# Patient Record
Sex: FEMALE | Race: WHITE | NOT HISPANIC OR LATINO | Employment: OTHER | URBAN - NONMETROPOLITAN AREA
[De-identification: names, ages, dates, MRNs, and addresses within clinical notes are randomized per-mention and may not be internally consistent; named-entity substitution may affect disease eponyms.]

---

## 2020-01-28 ENCOUNTER — OUTSIDE FACILITY SERVICE (OUTPATIENT)
Dept: CARDIOLOGY | Facility: CLINIC | Age: 60
End: 2020-01-28

## 2020-01-28 ENCOUNTER — TRANSCRIBE ORDERS (OUTPATIENT)
Dept: CARDIOLOGY | Facility: HOSPITAL | Age: 60
End: 2020-01-28

## 2020-01-28 DIAGNOSIS — R06.02 SHORTNESS OF BREATH: Primary | ICD-10-CM

## 2020-01-28 PROCEDURE — 99204 OFFICE O/P NEW MOD 45 MIN: CPT | Performed by: INTERNAL MEDICINE

## 2020-01-28 PROCEDURE — 93010 ELECTROCARDIOGRAM REPORT: CPT | Performed by: INTERNAL MEDICINE

## 2020-02-05 ENCOUNTER — HOSPITAL ENCOUNTER (OUTPATIENT)
Dept: CARDIOLOGY | Facility: HOSPITAL | Age: 60
Discharge: HOME | End: 2020-02-05
Admitting: INTERNAL MEDICINE

## 2020-02-05 ENCOUNTER — HOSPITAL ENCOUNTER (OUTPATIENT)
Dept: CARDIOLOGY | Facility: HOSPITAL | Age: 60
Discharge: HOME | End: 2020-02-05

## 2020-02-05 VITALS
SYSTOLIC BLOOD PRESSURE: 119 MMHG | HEIGHT: 64 IN | WEIGHT: 240 LBS | BODY MASS INDEX: 40.97 KG/M2 | DIASTOLIC BLOOD PRESSURE: 65 MMHG

## 2020-02-05 DIAGNOSIS — R06.02 SHORTNESS OF BREATH: ICD-10-CM

## 2020-02-05 LAB
BH CV ECHO MEAS - ACS: 1.8 CM
BH CV ECHO MEAS - AO MAX PG (FULL): 3.6 MMHG
BH CV ECHO MEAS - AO MAX PG: 9 MMHG
BH CV ECHO MEAS - AO MEAN PG (FULL): 3 MMHG
BH CV ECHO MEAS - AO MEAN PG: 5.5 MMHG
BH CV ECHO MEAS - AO ROOT AREA: 8 CM^2
BH CV ECHO MEAS - AO ROOT DIAM: 3.2 CM
BH CV ECHO MEAS - AO V2 MAX: 150 CM/SEC
BH CV ECHO MEAS - AO V2 MEAN: 112.6 CM/SEC
BH CV ECHO MEAS - AO V2 VTI: 26.8 CM
BH CV ECHO MEAS - AVA(I,A): 2.6 CM^2
BH CV ECHO MEAS - AVA(I,D): 2.6 CM^2
BH CV ECHO MEAS - AVA(V,A): 2.6 CM^2
BH CV ECHO MEAS - AVA(V,D): 2.6 CM^2
BH CV ECHO MEAS - EDV(CUBED): 73.4 ML
BH CV ECHO MEAS - EDV(MOD-SP4): 57.6 ML
BH CV ECHO MEAS - EDV(TEICH): 78 ML
BH CV ECHO MEAS - EF(CUBED): 78.6 %
BH CV ECHO MEAS - EF(MOD-SP4): 56.4 %
BH CV ECHO MEAS - EF(TEICH): 71.2 %
BH CV ECHO MEAS - ESV(CUBED): 15.7 ML
BH CV ECHO MEAS - ESV(MOD-SP4): 25.1 ML
BH CV ECHO MEAS - ESV(TEICH): 22.4 ML
BH CV ECHO MEAS - FS: 40.2 %
BH CV ECHO MEAS - IVS/LVPW: 1.2
BH CV ECHO MEAS - IVSD: 1.2 CM
BH CV ECHO MEAS - LA DIMENSION: 3.6 CM
BH CV ECHO MEAS - LA/AO: 1.1
BH CV ECHO MEAS - LV MASS(C)D: 163.3 GRAMS
BH CV ECHO MEAS - LV MAX PG: 5.4 MMHG
BH CV ECHO MEAS - LV MEAN PG: 2.6 MMHG
BH CV ECHO MEAS - LV V1 MAX: 116.5 CM/SEC
BH CV ECHO MEAS - LV V1 MEAN: 74.8 CM/SEC
BH CV ECHO MEAS - LV V1 VTI: 21.2 CM
BH CV ECHO MEAS - LVIDD: 4.2 CM
BH CV ECHO MEAS - LVIDS: 2.5 CM
BH CV ECHO MEAS - LVOT AREA: 3.3 CM^2
BH CV ECHO MEAS - LVOT DIAM: 2.1 CM
BH CV ECHO MEAS - LVPWD: 1 CM
BH CV ECHO MEAS - MV A MAX VEL: 91.2 CM/SEC
BH CV ECHO MEAS - MV DEC SLOPE: 344.7 CM/SEC^2
BH CV ECHO MEAS - MV DEC TIME: 0.19 SEC
BH CV ECHO MEAS - MV E MAX VEL: 64.3 CM/SEC
BH CV ECHO MEAS - MV E/A: 0.7
BH CV ECHO MEAS - MV MAX PG: 3.4 MMHG
BH CV ECHO MEAS - MV MEAN PG: 1.6 MMHG
BH CV ECHO MEAS - MV V2 MAX: 91.6 CM/SEC
BH CV ECHO MEAS - MV V2 MEAN: 60 CM/SEC
BH CV ECHO MEAS - MV V2 VTI: 19.2 CM
BH CV ECHO MEAS - MVA(VTI): 3.6 CM^2
BH CV ECHO MEAS - SV(AO): 213.1 ML
BH CV ECHO MEAS - SV(CUBED): 57.7 ML
BH CV ECHO MEAS - SV(LVOT): 70.2 ML
BH CV ECHO MEAS - SV(MOD-SP4): 32.5 ML
BH CV ECHO MEAS - SV(TEICH): 55.6 ML
BH CV STRESS COMMENTS STAGE 1: NORMAL
BH CV STRESS DOSE REGADENOSON STAGE 1: 0.4
BH CV STRESS DURATION MIN STAGE 1: 0
BH CV STRESS DURATION SEC STAGE 1: 10
BH CV STRESS PROTOCOL 1: NORMAL
BH CV STRESS RECOVERY BP: NORMAL MMHG
BH CV STRESS RECOVERY HR: 90 BPM
BH CV STRESS STAGE 1: 1
LV EF 2D ECHO EST: 55 %
LV EF NUC BP: 60 %
MAXIMAL PREDICTED HEART RATE: 163 BPM
MAXIMAL PREDICTED HEART RATE: 163 BPM
STRESS BASELINE BP: NORMAL MMHG
STRESS BASELINE HR: 83 BPM
STRESS TARGET HR: 139 BPM
STRESS TARGET HR: 139 BPM

## 2020-02-05 PROCEDURE — 93016 CV STRESS TEST SUPVJ ONLY: CPT | Performed by: INTERNAL MEDICINE

## 2020-02-05 PROCEDURE — 93017 CV STRESS TEST TRACING ONLY: CPT

## 2020-02-05 PROCEDURE — 78452 HT MUSCLE IMAGE SPECT MULT: CPT

## 2020-02-05 PROCEDURE — A9500 TC99M SESTAMIBI: HCPCS | Performed by: INTERNAL MEDICINE

## 2020-02-05 PROCEDURE — 93306 TTE W/DOPPLER COMPLETE: CPT | Performed by: INTERNAL MEDICINE

## 2020-02-05 PROCEDURE — 78452 HT MUSCLE IMAGE SPECT MULT: CPT | Performed by: INTERNAL MEDICINE

## 2020-02-05 PROCEDURE — 25010000002 REGADENOSON 0.4 MG/5ML SOLUTION: Performed by: INTERNAL MEDICINE

## 2020-02-05 PROCEDURE — 0 TECHNETIUM SESTAMIBI: Performed by: INTERNAL MEDICINE

## 2020-02-05 PROCEDURE — 93306 TTE W/DOPPLER COMPLETE: CPT

## 2020-02-05 PROCEDURE — 93018 CV STRESS TEST I&R ONLY: CPT | Performed by: INTERNAL MEDICINE

## 2020-02-05 RX ADMIN — TECHNETIUM TC 99M SESTAMIBI 1 DOSE: 1 INJECTION INTRAVENOUS at 15:00

## 2020-02-05 RX ADMIN — REGADENOSON 0.4 MG: 0.08 INJECTION, SOLUTION INTRAVENOUS at 15:00

## 2020-02-05 RX ADMIN — TECHNETIUM TC 99M SESTAMIBI 1 DOSE: 1 INJECTION INTRAVENOUS at 13:30

## 2020-02-06 ENCOUNTER — TELEPHONE (OUTPATIENT)
Dept: CARDIOLOGY | Facility: CLINIC | Age: 60
End: 2020-02-06

## 2020-02-06 NOTE — TELEPHONE ENCOUNTER
Please call patient at [phone] (if today) or her friend's home if Friday, [phone].    Request for test results, her cell cut out when Dr. Green tried to speak to her yesterday.

## 2020-02-07 NOTE — TELEPHONE ENCOUNTER
Can you please try to contact the patient to schedule a follow-up appt with  the first week of March? - Thank You

## 2020-02-07 NOTE — TELEPHONE ENCOUNTER
I called again and left a message at friend's home patient needs to make an appointment to see me in 3 to 4 weeks  Stress test looks okay

## 2020-02-10 ENCOUNTER — TELEPHONE (OUTPATIENT)
Dept: CARDIOLOGY | Facility: CLINIC | Age: 60
End: 2020-02-10

## 2020-02-10 NOTE — TELEPHONE ENCOUNTER
Patient called stating that she had the stress test done last week and after she was done she noticed that her varicose vein in the left leg was red and slightly swollen. She states that over the weekend it seemed to get larger, more red, and it is hard.     I recommended that she contact her PCP to have it looked at. I expressed that I am not sure if the contrast dye would affect her varicose vein, but I could not be 100% sure and  is out of the office.     Patient voiced her understanding and stated that she will go see her PCP to have it checked out.

## 2020-02-14 ENCOUNTER — TRANSCRIBE ORDERS (OUTPATIENT)
Dept: ADMINISTRATIVE | Facility: HOSPITAL | Age: 60
End: 2020-02-14

## 2020-02-14 DIAGNOSIS — N18.4 CHRONIC KIDNEY DISEASE, STAGE IV (SEVERE) (HCC): Primary | ICD-10-CM

## 2020-02-20 ENCOUNTER — APPOINTMENT (OUTPATIENT)
Dept: CARDIOLOGY | Facility: HOSPITAL | Age: 60
End: 2020-02-20

## 2020-02-26 ENCOUNTER — HOSPITAL ENCOUNTER (OUTPATIENT)
Dept: CARDIOLOGY | Facility: HOSPITAL | Age: 60
Discharge: HOME | End: 2020-02-26
Admitting: INTERNAL MEDICINE

## 2020-02-26 DIAGNOSIS — N18.4 CHRONIC KIDNEY DISEASE, STAGE IV (SEVERE) (HCC): ICD-10-CM

## 2020-02-26 LAB
BH CV ECHO MEAS - DIST REN A PSV LEFT: 78 CM/S
BH CV ECHO MEAS - MID REN A PSV LEFT: 114 CM/S
BH CV ECHO MEAS - PROX REN A PSV LEFT: 104 CM/S
BH CV VAS RENAL AORTIC MID PSV: 114 CM/S
BH CV XLRA MEAS - KID L LEFT: 11 CM
BH CV XLRA MEAS DIST REN A PSV RIGHT: 131 CM/S
BH CV XLRA MEAS KID L RIGHT: 6.6 CM
BH CV XLRA MEAS MID REN A PSV RIGHT: 122 CM/S
BH CV XLRA MEAS PROX REN A PSV RIGHT: 89 CM/S
BH CV XLRA MEAS RAR LEFT: 1
BH CV XLRA MEAS RAR RIGHT: 1.15
LEFT RENAL UPPER PARENCHYMA MAX: 32 CM/S
LEFT RENAL UPPER PARENCHYMA MIN: 9 CM/S
LEFT RENAL UPPER PARENCHYMA RI: 0.72
RIGHT RENAL UPPER PARENCHYMA MAX: 21 CM/S
RIGHT RENAL UPPER PARENCHYMA MIN: 8 CM/S
RIGHT RENAL UPPER PARENCHYMA RI: 0.62

## 2020-02-26 PROCEDURE — 93975 VASCULAR STUDY: CPT
